# Patient Record
Sex: MALE | Race: BLACK OR AFRICAN AMERICAN | URBAN - METROPOLITAN AREA
[De-identification: names, ages, dates, MRNs, and addresses within clinical notes are randomized per-mention and may not be internally consistent; named-entity substitution may affect disease eponyms.]

---

## 2022-10-17 ENCOUNTER — OFFICE VISIT (OUTPATIENT)
Dept: URBAN - METROPOLITAN AREA CLINIC 16 | Facility: CLINIC | Age: 62
End: 2022-10-17
Payer: MEDICARE

## 2022-10-17 DIAGNOSIS — H25.813 COMBINED FORMS OF AGE-RELATED CATARACT, BILATERAL: ICD-10-CM

## 2022-10-17 DIAGNOSIS — H43.11 VITREOUS HEMORRHAGE, RIGHT EYE: Primary | ICD-10-CM

## 2022-10-17 PROCEDURE — 92004 COMPRE OPH EXAM NEW PT 1/>: CPT | Performed by: OPTOMETRIST

## 2022-10-17 ASSESSMENT — INTRAOCULAR PRESSURE
OD: 26
OS: 19

## 2022-10-17 NOTE — IMPRESSION/PLAN
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 Plan: see #1.

## 2022-10-17 NOTE — IMPRESSION/PLAN
Impression: Vitreous hemorrhage, right eye: H43.11. Plan: Discussed condition w/pt. OPTOS photos performed and reviewed.  Refer for soonest available consult w/retina specialist.

## 2022-10-17 NOTE — IMPRESSION/PLAN
Impression: Combined forms of age-related cataract, bilateral: H25.813. Plan: Consider cataract eval w/specialist after retina condition is addressed.

## 2022-10-18 ENCOUNTER — OFFICE VISIT (OUTPATIENT)
Dept: URBAN - METROPOLITAN AREA CLINIC 23 | Facility: CLINIC | Age: 62
End: 2022-10-18
Payer: MEDICARE

## 2022-10-18 DIAGNOSIS — E11.3591 TYPE 2 DIAB WITH PROLIF DIAB RTNOP WITHOUT MCLR EDEMA, R EYE: Primary | ICD-10-CM

## 2022-10-18 DIAGNOSIS — E11.9 TYPE 2 DM W/O COMPLICATION: ICD-10-CM

## 2022-10-18 PROCEDURE — 92134 CPTRZ OPH DX IMG PST SGM RTA: CPT | Performed by: OPHTHALMOLOGY

## 2022-10-18 PROCEDURE — 99204 OFFICE O/P NEW MOD 45 MIN: CPT | Performed by: OPHTHALMOLOGY

## 2022-10-18 ASSESSMENT — INTRAOCULAR PRESSURE
OS: 30
OD: 20

## 2022-10-18 NOTE — IMPRESSION/PLAN
Impression: Type 2 diab with prolif diab rtnop without mclr edema, r eye: E11.3591. Right. Condition: new problem addtl w/u needed. Vision: vision affected. Plan: Discussed diagnosis in detail with patient. Discussed risks of progression. Exam OD shows a Vitreous Hemorrhage most likely from Diabetes. Discussed treatment options: surgery vs Intravitreal injection tx. Based on today's exam, diagnostic studies and review of records, recommend Intravitreal Injection Treatment RIGHT EYE with AVASTIN in order to help reduce and stabilize the bleeding in order to prevent a further reduction in vision. Discussed the risks and benefits of tx. All questions answered. Patient elects to proceed with recommendation. OCT OD shows a hazy view and Optos OD shows VH w/hazy view. Patient understands that additional WERNER treatments or laser treatments may be needed in the future. IOP OD elevated yesterday and IOP OS elevated today, recommend a Glaucoma evaluation.

## 2022-10-18 NOTE — IMPRESSION/PLAN
Impression: Type 2 DM w/o complication: Q38.7. Left. Condition: new prob, no addtl w/u needed. Plan: Discussed diagnosis in detail with patient. Exam shows no active Diabetic changes OS. No treatment is recommended at this time. Emphasized blood sugar control and advised to keep future appointments with PCP and/or Endocrinologist for the management of Diabetes. Recommend observation for now. OCT and Optos OS shows the macula / retina is stable.

## 2022-10-18 NOTE — IMPRESSION/PLAN
Impression: Vitreous hemorrhage, right eye associated with DM: H43.11. Right. Condition: new problem addtl w/u needed. Plan: Discussed diagnosis in detail with patient. Discussed risks of progression. Recommend WERNER tx OD - see notes above.

## 2022-10-21 ENCOUNTER — PROCEDURE (OUTPATIENT)
Dept: URBAN - METROPOLITAN AREA CLINIC 23 | Facility: CLINIC | Age: 62
End: 2022-10-21
Payer: MEDICARE

## 2022-10-21 PROCEDURE — 67028 INJECTION EYE DRUG: CPT | Performed by: OPHTHALMOLOGY

## 2022-11-02 ENCOUNTER — OFFICE VISIT (OUTPATIENT)
Dept: URBAN - METROPOLITAN AREA CLINIC 28 | Facility: CLINIC | Age: 62
End: 2022-11-02
Payer: MEDICARE

## 2022-11-02 DIAGNOSIS — H04.123 TEAR FILM INSUFFICIENCY OF BILATERAL LACRIMAL GLANDS: ICD-10-CM

## 2022-11-02 DIAGNOSIS — H25.813 COMBINED FORMS OF AGE-RELATED CATARACT, BILATERAL: ICD-10-CM

## 2022-11-02 DIAGNOSIS — H43.11 VITREOUS HEMORRHAGE, RIGHT EYE: ICD-10-CM

## 2022-11-02 DIAGNOSIS — H40.1131 PRIMARY OPEN-ANGLE GLAUCOMA, MILD STAGE, BILATERAL: Primary | ICD-10-CM

## 2022-11-02 DIAGNOSIS — E11.9 TYPE 2 DM W/O COMPLICATION: ICD-10-CM

## 2022-11-02 DIAGNOSIS — E11.3591 TYPE 2 DIAB WITH PROLIF DIAB RTNOP WITHOUT MCLR EDEMA, R EYE: ICD-10-CM

## 2022-11-02 PROCEDURE — 76514 ECHO EXAM OF EYE THICKNESS: CPT | Performed by: STUDENT IN AN ORGANIZED HEALTH CARE EDUCATION/TRAINING PROGRAM

## 2022-11-02 PROCEDURE — 92133 CPTRZD OPH DX IMG PST SGM ON: CPT | Performed by: STUDENT IN AN ORGANIZED HEALTH CARE EDUCATION/TRAINING PROGRAM

## 2022-11-02 PROCEDURE — 92020 GONIOSCOPY: CPT | Performed by: STUDENT IN AN ORGANIZED HEALTH CARE EDUCATION/TRAINING PROGRAM

## 2022-11-02 PROCEDURE — 92004 COMPRE OPH EXAM NEW PT 1/>: CPT | Performed by: STUDENT IN AN ORGANIZED HEALTH CARE EDUCATION/TRAINING PROGRAM

## 2022-11-02 RX ORDER — LATANOPROST 50 UG/ML
0.005 % SOLUTION OPHTHALMIC
Qty: 2.5 | Refills: 5 | Status: ACTIVE
Start: 2022-11-02

## 2022-11-02 ASSESSMENT — INTRAOCULAR PRESSURE
OS: 26
OD: 24

## 2022-11-02 NOTE — IMPRESSION/PLAN
Impression: Type 2 diab with prolif diab rtnop without mclr edema, r eye: E11.3591. Right.  Plan: see above

## 2022-11-02 NOTE — IMPRESSION/PLAN
Impression: Combined forms of age-related cataract, bilateral: H25.813. Plan: Cataracts are non-visually significant. Will continue to monitor.

## 2022-11-02 NOTE — IMPRESSION/PLAN
Impression: Vitreous hemorrhage, right eye associated with DM: H43.11. Right. Plan: s/p Avastin inj OD. Vit heme appears mostly resolved. Some dot-blot hemes noted during today's undilated exam. Followed by Dr. Marti Mcgill

## 2022-11-02 NOTE — IMPRESSION/PLAN
Impression: Primary open-angle glaucoma, mild stage, bilateral: H40.7021. Plan: Ocular Surgical History: s/p Avastin inj OD 10/21/22 Pachy: [id number] Tmax: 26/30 Target IOP: mid teens OU Med Allergies: none Heart / Lung / Kidney disease/sulfa allergy: Pt. denies Gonioscopy: open to cbb 360 2+ tmp OU
OCT: OD 70 early pole thinning ; 73 inf thinning HVF: pending C/D: 0.4/0.4 Better seeing eye: OS
IOP Today: 24/26 Plan: IOP today is elevated OU. s/p Vit Heme OD. Patient received Avastin injection OD 10/21/22 by Dr. Nini Quintero. Appropriate testing ordered and reviewed. RNFL shows early signs of glaucoma. Recommend lowering IOP OU by using drops. Patient understands and agrees. Will see pt sooner for VFT. Drops: Start: Latanoprost QHS OU Discussed natural history of glaucoma and that irreversible vision loss can occur without adequate IOP control. Emphasized compliance with eyedrops and other treatment described above.

## 2022-11-02 NOTE — IMPRESSION/PLAN
Impression: Type 2 DM w/o complication: L31.5. Left. Condition: new prob, no addtl w/u needed. Plan: Emphasized blood sugar control and advised to keep future appointments with PCP and/or Endocrinologist for the management of Diabetes. Continue care with retina.

## 2022-12-28 ENCOUNTER — OFFICE VISIT (OUTPATIENT)
Dept: URBAN - METROPOLITAN AREA CLINIC 28 | Facility: CLINIC | Age: 62
End: 2022-12-28
Payer: MEDICARE

## 2022-12-28 DIAGNOSIS — H40.1131 PRIMARY OPEN-ANGLE GLAUCOMA, MILD STAGE, BILATERAL: Primary | ICD-10-CM

## 2022-12-28 PROCEDURE — 99213 OFFICE O/P EST LOW 20 MIN: CPT | Performed by: STUDENT IN AN ORGANIZED HEALTH CARE EDUCATION/TRAINING PROGRAM

## 2022-12-28 RX ORDER — LATANOPROST 50 UG/ML
0.005 % SOLUTION OPHTHALMIC
Qty: 7.5 | Refills: 2 | Status: ACTIVE
Start: 2022-12-28

## 2022-12-28 ASSESSMENT — INTRAOCULAR PRESSURE
OD: 20
OS: 19

## 2022-12-28 NOTE — IMPRESSION/PLAN
Impression: Primary open-angle glaucoma, mild stage, bilateral: H40.1131. Plan: Ocular Surgical History: s/p Avastin inj OD 10/21/22 Pachy: [id number] Tmax: 26/30 Target IOP: mid teens OU Med Allergies: none Heart / Lung / Kidney disease/sulfa allergy: Pt. denies Gonioscopy: open to cbb 360 2+ tmp OU
OCT: OD 70 early pole thinning ; 73 inf thinning HVF: MD OD -1 sup defs ; OS -5 DNS, sup defs C/D: 0.4/0.4 Better seeing eye: OS
IOP Today: 20/19 Plan: IOP today is lower OU since starting Latan. IOP at goal OU. VF shows some nasal vision loss OS; wnl OD. No changes to drop regimen being made at this time. Will continue to monitor IOP and testing. Drops: CONTINUE Latanoprost QHS OU Discussed natural history of glaucoma and that irreversible vision loss can occur without adequate IOP control. Emphasized compliance with eyedrops and other treatment described above.

## 2024-04-12 ENCOUNTER — OFFICE VISIT (OUTPATIENT)
Dept: URBAN - METROPOLITAN AREA CLINIC 28 | Facility: CLINIC | Age: 64
End: 2024-04-12
Payer: MEDICARE

## 2024-04-12 DIAGNOSIS — H40.1131 PRIMARY OPEN-ANGLE GLAUCOMA, MILD STAGE, BILATERAL: ICD-10-CM

## 2024-04-12 DIAGNOSIS — H52.4 PRESBYOPIA: ICD-10-CM

## 2024-04-12 DIAGNOSIS — H25.813 COMBINED FORMS OF AGE-RELATED CATARACT, BILATERAL: ICD-10-CM

## 2024-04-12 DIAGNOSIS — Z79.84 LONG TERM CURRENT USE OF ORAL HYPOGLYCEMIC DRUGS: ICD-10-CM

## 2024-04-12 DIAGNOSIS — E11.3293 TYPE 2 DIAB W MILD NONPRLF DIABETIC RTNOP W/O MACULAR EDEMA, BILATERAL: Primary | ICD-10-CM

## 2024-04-12 PROCEDURE — 92133 CPTRZD OPH DX IMG PST SGM ON: CPT | Performed by: OPTOMETRIST

## 2024-04-12 PROCEDURE — 92004 COMPRE OPH EXAM NEW PT 1/>: CPT | Performed by: OPTOMETRIST

## 2024-04-12 PROCEDURE — 92134 CPTRZ OPH DX IMG PST SGM RTA: CPT | Performed by: OPTOMETRIST

## 2024-04-12 RX ORDER — LATANOPROST 50 UG/ML
0.005 % SOLUTION OPHTHALMIC
Qty: 7.5 | Refills: 4 | Status: ACTIVE
Start: 2024-04-12

## 2024-04-12 ASSESSMENT — VISUAL ACUITY
OS: 20/30
OD: 20/20

## 2024-04-12 ASSESSMENT — KERATOMETRY
OD: 44.25
OS: 44.50

## 2024-04-12 ASSESSMENT — INTRAOCULAR PRESSURE
OD: 20
OS: 21

## 2025-07-28 ENCOUNTER — OFFICE VISIT (OUTPATIENT)
Dept: URBAN - METROPOLITAN AREA CLINIC 28 | Facility: CLINIC | Age: 65
End: 2025-07-28
Payer: MEDICARE

## 2025-07-28 DIAGNOSIS — H25.813 COMBINED FORMS OF AGE-RELATED CATARACT, BILATERAL: ICD-10-CM

## 2025-07-28 DIAGNOSIS — H40.1131 PRIMARY OPEN-ANGLE GLAUCOMA, BILATERAL, MILD STAGE: ICD-10-CM

## 2025-07-28 DIAGNOSIS — Z79.84 LONG TERM CURRENT USE OF ORAL HYPOGLYCEMIC DRUGS: ICD-10-CM

## 2025-07-28 DIAGNOSIS — E11.3293 TYPE 2 DIAB W MILD NONPRLF DIABETIC RTNOP W/O MACULAR EDEMA, BILATERAL: Primary | ICD-10-CM

## 2025-07-28 PROCEDURE — 92083 EXTENDED VISUAL FIELD XM: CPT | Performed by: OPTOMETRIST

## 2025-07-28 PROCEDURE — 92014 COMPRE OPH EXAM EST PT 1/>: CPT | Performed by: OPTOMETRIST

## 2025-07-28 ASSESSMENT — INTRAOCULAR PRESSURE
OS: 18
OD: 19